# Patient Record
Sex: MALE | Race: WHITE | Employment: PART TIME | URBAN - METROPOLITAN AREA
[De-identification: names, ages, dates, MRNs, and addresses within clinical notes are randomized per-mention and may not be internally consistent; named-entity substitution may affect disease eponyms.]

---

## 2020-01-30 ENCOUNTER — MEDICAL CORRESPONDENCE (OUTPATIENT)
Dept: HEALTH INFORMATION MANAGEMENT | Facility: CLINIC | Age: 20
End: 2020-01-30

## 2020-03-06 ENCOUNTER — TRANSFERRED RECORDS (OUTPATIENT)
Dept: HEALTH INFORMATION MANAGEMENT | Facility: CLINIC | Age: 20
End: 2020-03-06

## 2020-09-10 VITALS — BODY MASS INDEX: 24.38 KG/M2 | WEIGHT: 180 LBS | HEIGHT: 72 IN

## 2020-09-10 RX ORDER — ERGOCALCIFEROL (VITAMIN D2) 10 MCG
25 TABLET ORAL DAILY
COMMUNITY

## 2020-09-10 RX ORDER — TRAZODONE HYDROCHLORIDE 50 MG/1
50 TABLET, FILM COATED ORAL AT BEDTIME
COMMUNITY

## 2020-09-10 ASSESSMENT — MIFFLIN-ST. JEOR: SCORE: 1859.47

## 2020-09-10 NOTE — PROGRESS NOTES
"William Forman is a 20 year old male who is being evaluated via a billable video visit.      The patient has been notified of following:     \"This video visit will be conducted via a call between you and your physician/provider. We have found that certain health care needs can be provided without the need for an in-person physical exam.  This service lets us provide the care you need with a video conversation.  If a prescription is necessary we can send it directly to your pharmacy.  If lab work is needed we can place an order for that and you can then stop by our lab to have the test done at a later time.    Video visits are billed at different rates depending on your insurance coverage.  Please reach out to your insurance provider with any questions.    If during the course of the call the physician/provider feels a video visit is not appropriate, you will not be charged for this service.\"    Patient has given verbal consent for Video visit? Yes  How would you like to obtain your AVS? Mail a copy  If you are dropped from the video visit, the video invite should be resent to: Text to cell phone: [phone]  Will anyone else be joining your video visit? No        Video-Visit Details    Type of service:  Video Visit    Video Start Time: 11:04 PM  Video End Time: 11:57 AM    Originating Location (pt. Location): Home    Distant Location (provider location):  [facility]     Platform used for Video Visit: Dennise Del Valle MD      "

## 2020-09-10 NOTE — PATIENT INSTRUCTIONS
Change your evening routine-avoid watching screens consider reading a positive book  Consider light blocking mask and white noise in bedroom  Eliminate or at least shorten naps  Cut back on caffeine to two in am              Insomnia and Behavioral Sleep Medicine Program    The [facility] Insomnia and Behavioral Sleep Medicine Program provides evidence-based non-drug treatment including:      Cognitive-behavioral Therapies for Insomnia (CBT-I)    Management of Shift-work and Jet Lag    Management of Delayed, Advanced and Irregular Circadian Rhythm Sleep Disorders    Frequently Asked Questions:    What is CBT-I?    Cognitive Behavioral Therapy for Insomnia, also known as CBT-I, is a highly effective non-drug treatment for insomnia. The American College of Physicians recommends CBT-I as the first treatment for chronic insomnia.  Research has shown CBT-I to be safer and more effective long term than sleeping pills.    What does CBT-I involve?     CBT-I targets behaviors that lead to chronic insomnia:    Habits that weaken the bed as a cue for sleep    Habits that weaken your body's sleep drive and sleep/wake clock     Unhelpful sleep thoughts that increase sleep-related worry and arousal.    The process works like a 4-6 session training program that provides you with the information and coaching needed to implement proven strategies to get a better night's sleep.    People often see improvement in their sleep within a few weeks. Research shows if you keep practicing the skills you learn your sleep is likely to continue to improve 6-12 months after treatment.    Does this program prescribe or manage sleep medication?    No.  Your prescribing provider is responsible to assist you in managing your sleep medications.  Some people choose to stop using sleep medication prior to or during CBT-I.  Our program can work with your prescribing providers to help reduce or eliminate use of sleep medications. Always talk with  "you prescribing provider before making any changes to your medication.     Preparing for your Insomnia Evaluation:    The [facility] Insomnia and Behavioral Sleep Medicine Program offers treatment at our [facility] as well as certain primary and specialty care clinics.  You can also receive care from the convenience of your own home through Telehealth visits or a guided internet CBT-I option.     If you have never been seen at an [facility], you will need to complete the Insomnia Health Questionnaire prior to your initial visit.  Click or copy the following link into your browser to complete: [email or web address]    Complete a daily Sleep Diary by downloading the free sleep marnie CBTi- marnie, enter your sleep data in the My Sleep section daily and have available for your visit. You can also use a paper diary which you can return by Hypr[employer] message, by email to [email or web address], or by fax to [phone].    Questions?      Call [phone] or email us at [email or web address]     MY TREATMENT INFORMATION FOR SLEEP APNEA-  William Forman    DOCTOR : Melissa Del Valle MD  SLEEP CENTER :      MY CONTACT NUMBER:     Am I having a sleep study at a sleep center?  Make sure you have an appointment for the study before you leave!    Am I having a home sleep study?  Watch this video:  /drop off device-   [email or web address]/[email or web address]?v=[email or web address]  Disposable device sent out require phone/computer application-   [email or web address]/watch?v=BCce_vbiwxE  Please verify your insurance coverage with your insurance carrier    Frequently asked questions:  1. What is Obstructive Sleep Apnea (BLANCHE)? BLANCHE is the most common type of sleep apnea. Apnea means, \"without breath.\"  Apnea is most often caused by narrowing or collapse of the upper airway as muscles relax during sleep.   Almost everyone has occasional apneas. Most people with sleep apnea have had brief " interruptions at night frequently for many years.  The severity of sleep apnea is related to how frequent and severe the events are.   2. What are the consequences of BLANCHE? Symptoms include: feeling sleepy during the day, snoring loudly, gasping or stopping of breathing, trouble sleeping, and occasionally morning headaches or heartburn at night.  Sleepiness can be serious and even increase the risk of falling asleep while driving. Other health consequences may include development of high blood pressure and other cardiovascular disease in persons who are susceptible. Untreated BLANCHE  can contribute to heart disease, stroke and diabetes.   3. What are the treatment options? In most situations, sleep apnea is a lifelong disease that must be managed with daily therapy. Medications are not effective for sleep apnea and surgery is generally not considered until other therapies have been tried. Your treatment is your choice . Continuous Positive Airway (CPAP) works right away and is the therapy that is effective in nearly everyone. An oral device to hold your jaw forward is usually the next most reliable option. Other options include postioning devices (to keep you off your back), weight loss, and surgery including a tongue pacing device. There is more detail about some of these options below.    Important tips for using CPAP and similar devices   Know your equipment:  CPAP is continuous positive airway pressure that prevents obstructive sleep apnea by keeping the throat from collapsing while you are sleeping. In most cases, the device is  smart  and can slowly self-adjusts if your throat collapses and keeps a record every day of how well you are treated-this information is available to you and your care team.  BPAP is bilevel positive airway pressure that keeps your throat open and also assists each breath with a pressure boost to maintain adequate breathing.  Special kinds of BPAP are used in patients who have inadequate  breathing from lung or heart disease. In most cases, the device is  smart  and can slowly self-adjusts to assist breathing. Like CPAP, the device keeps a record of how well you are treated.  Your mask is your connection to the device. You get to choose what feels most comfortable and the staff will help to make sure if fits. Here: are some examples of the different masks that are available:       Key points to remember on your journey with sleep apnea:  1. Sleep study.  PAP devices often need to be adjusted during a sleep study to show that they are effective and adjusted right.  2. Good tips to remember: Try wearing just the mask during a quiet time during the day so your body adapts to wearing it. A humidifier is recommended for comfort in most cases to prevent drying of your nose and throat. Allergy medication from your provider may help you if you are having nasal congestion.  3. Getting settled-in. It takes more than one night for most of us to get used to wearing a mask. Try wearing just the mask during a quiet time during the day so your body adapts to wearing it. A humidifier is recommended for comfort in most cases. Our team will work with you carefully on the first day and will be in contact within 4 days and again at 2 and 4 weeks for advice and remote device adjustments. Your therapy is evaluated by the device each day.   4. Use it every night. The more you are able to sleep naturally for 7-8 hours, the more likely you will have good sleep and to prevent health risks or symptoms from sleep apnea. Even if you use it 4 hours it helps. Occasionally all of us are unable to use a medical therapy, in sleep apnea, it is not dangerous to miss one night.   5. Communicate. Call our skilled team on the number provided on the first day if your visit for problems that make it difficult to wear the device. Over 2 out of 3 patients can learn to wear the device long-term with help from our team. Remember to call our  team or your sleep providers if you are unable to wear the device as we may have other solutions for those who cannot adapt to mask CPAP therapy. It is recommended that you sleep your sleep provider within the first 3 months and yearly after that if you are not having problems.   6. Use it for your health. We encourage use of CPAP masks during daytime quiet periods to allow your face and brain to adapt to the sensation of CPAP so that it will be a more natural sensation to awaken to at night or during naps. This can be very useful during the first few weeks or months of adapting to CPAP though it does not help medically to wear CPAP during wakefulness and  should not be used as a strategy just to meet guidelines.  7. Take care of your equipment. Make sure you clean your mask and tubing using directions every day and that your filter and mask are replaced as recommended or if they are not working.     BESIDES CPAP, WHAT OTHER THERAPIES ARE THERE?    Positioning Device  Positioning devices are generally used when sleep apnea is mild and only occurs on your back.This example shows a pillow that straps around the waist. It may be appropriate for those whose sleep study shows milder sleep apnea that occurs primarily when lying flat on one's back. Preliminary studies have shown benefit but effectiveness at home may need to be verified by a home sleep test. These devices are generally not covered by medical insurance.  Examples of devices that maintain sleeping on the back to prevent snoring and mild sleep apnea.    Belt type body positioner  Http://Faculte.Paddle8/    Electronic reminder  Http://nightshifttherapy.com/  Http://[email or web address].Paddle8.au/      Oral Appliance  What is oral appliance therapy?  An oral appliance device fits on your teeth at night like a retainer used after having braces. The device is made by a specialized dentist and requires several visits over 1-2 months before a manufactured device is made to fit  your teeth and is adjusted to prevent your sleep apnea. Once an oral device is working properly, snoring should be improved. A home sleep test may be recommended at that time if to determine whether the sleep apnea is adequately treated.       Some things to remember:  -Oral devices are often, but not always, covered by your medical insurance. Be sure to check with your insurance provider.   -If you are referred for oral therapy, you will be given a list of specialized dentists to consider or you may choose to visit the Web site of the American Academy of Dental Sleep Medicine  -Oral devices are less likely to work if you have severe sleep apnea or are extremely overweight.     More detailed information  An oral appliance is a small acrylic device that fits over the upper and lower teeth  (similar to a retainer or a mouth guard). This device slightly moves jaw forward, which moves the base of the tongue forward, opens the airway, improves breathing for effective treat snoring and obstructive sleep apnea in perhaps 7 out of 10 people .  The best working devices are custom-made by a dental device  after a mold is made of the teeth 1, 2, 3.  When is an oral appliance indicated?  Oral appliance therapy is recommended as a first-line treatment for patients with primary snoring, mild sleep apnea, and for patients with moderate sleep apnea who prefer appliance therapy to use of CPAP4, 5. Severity of sleep apnea is determined by sleep testing and is based on the number of respiratory events per hour of sleep.   How successful is oral appliance therapy?  The success rate of oral appliance therapy in patients with mild sleep apnea is 75-80% while in patients with moderate sleep apnea it is 50-70%. The chance of success in patients with severe sleep apnea is 40-50%. The research also shows that oral appliances have a beneficial effect on the cardiovascular health of BLANCHE patients at the same magnitude as CPAP  therapy7.  Oral appliances should be a second-line treatment in cases of severe sleep apnea, but if not completely successful then a combination therapy utilizing CPAP plus oral appliance therapy may be effective. Oral appliances tend to be effective in a broad range of patients although studies show that the patients who have the highest success are females, younger patients, those with milder disease, and less severe obesity. 3, 6.   Finding a dentist that practices dental sleep medicine  Specific training is available through the American Academy of Dental Sleep Medicine for dentists interested in working in the field of sleep. To find a dentist who is educated in the field of sleep and the use of oral appliances, near you, visit the Web site of the American Academy of Dental Sleep Medicine.    References  1. Ethel et al. Objectively measured vs self-reported compliance during oral appliance therapy for sleep-disordered breathing. Chest 2013; 144(5): 1827-7501.  2. Ray et al. Objective measurement of compliance during oral appliance therapy for sleep-disordered breathing. Thorax 2013; 68(1): 91-96.  3. Erna et al. Mandibular advancement devices in 620 men and women with BLANCHE and snoring: tolerability and predictors of treatment success. Chest 2004; 125: 2737-8272.  4. Leighton, et al. Oral appliances for snoring and BLANCHE: a review. Sleep 2006; 29: 244-262.  5. Peter et al. Oral appliance treatment for BLANCHE: an update. J Clin Sleep Med 2014; 10(2): 215-227.  6. Lubna et al. Predictors of OSAH treatment outcome. J Dent Res 2007; 86: 9156-3414.      Weight Loss:    Weight loss is a long-term strategy that may improve sleep apnea in some patients.      Surgery:    Surgery for obstructive sleep apnea is considered generally only when other therapies fail to work. Surgery may be discussed with you if you are having a difficult time tolerating CPAP and or when there is an abnormal structure  that requires surgical correction.  Nose and throat surgeries often enlarge the airway to prevent collapse.  Most of these surgeries create pain for 1-2 weeks and up to half of the most common surgeries are not effective throughout life.  You should carefully discuss the benefits and drawbacks to surgery with your sleep provider and surgeon to determine if it is the best solution for you.   More information  Surgery for BLANCHE is directed at areas that are responsible for narrowing or complete obstruction of the airway during sleep.  There are a wide range of procedures available to enlarge and/or stabilize the airway to prevent blockage of breathing in the three major areas where it can occur: the palate, tongue, and nasal regions.  Successful surgical treatment depends on the accurate identification of the factors responsible for obstructive sleep apnea in each person.  A personalized approach is required because there is no single treatment that works well for everyone.  Because of anatomic variation, consultation with an examination by a sleep surgeon is a critical first step in determining what surgical options are best for each patient.  In some cases, examination during sedation may be recommended in order to guide the selection of procedures.  Patients will be counseled about risks and benefits as well as the typical recovery course after surgery. Surgery is typically not a cure for a person s BLANCHE.  However, surgery will often significantly improve one s BLANCHE severity (termed  success rate ).  Even in the absence of a cure, surgery will decrease the cardiovascular risk associated with OSA7; improve overall quality of life8 (sleepiness, functionality, sleep quality, etc).      Palate Procedures:  Patients with BLANCHE often have narrowing of their airway in the region of their tonsils and uvula.  The goals of palate procedures are to widen the airway in this region as well as to help the tissues resist collapse.   Modern palate procedure techniques focus on tissue conservation and soft tissue rearrangement, rather than tissue removal.  Often the uvula is preserved in this procedure. Residual sleep apnea is common in patient after pharyngoplasty with an average reduction in sleep apnea events of 33%2.      Tongue Procedures:  ExamWhile patients are awake, the muscles that surround the throat are active and keep this region open for breathing. These muscles relax during sleep, allowing the tongue and other structures to collapse and block breathing.  There are several different tongue procedures available.  Selection of a tongue base procedure depends on characteristics seen on physical exam.  Generally, procedures are aimed at removing bulky tissues in this area or preventing the back of the tongue from falling back during sleep.  Success rates for tongue surgery range from 50-62%3.    Hypoglossal Nerve Stimulation:  Hypoglossal nerve stimulation has recently received approval from the [place] Food and Drug Administration for the treatment of obstructive sleep apnea.  This is based on research showing that the system was safe and effective in treating sleep apnea6.  Results showed that the median AHI score decreased 68%, from 29.3 to 9.0. This therapy uses an implant system that senses breathing patterns and delivers mild stimulation to airway muscles, which keeps the airway open during sleep.  The system consists of three fully implanted components: a small generator (similar in size to a pacemaker), a breathing sensor, and a stimulation lead.  Using a small handheld remote, a patient turns the therapy on before bed and off upon awakening.    Candidates for this device must be greater than 22 years of age, have moderate to severe BLANCHE (AHI between 20-65), BMI less than 32, have tried CPAP/oral appliance without success, and have appropriate upper airway anatomy (determined by a sleep endoscopy performed by   Hsia).    Hypoglossal Nerve Stimulation Pathway:    The sleep surgeon s office will work with the patient through the insurance prior-authorization process (including communications and appeals).    Nasal Procedures:  Nasal obstruction can interfere with nasal breathing during the day and night.  Studies have shown that relief of nasal obstruction can improve the ability of some patients to tolerate positive airway pressure therapy for obstructive sleep apnea1.  Treatment options include medications such as nasal saline, topical corticosteroid and antihistamine sprays, and oral medications such as antihistamines or decongestants. Non-surgical treatments can include external nasal dilators for selected patients. If these are not successful by themselves, surgery can improve the nasal airway either alone or in combination with these other options.      Combination Procedures:  Combination of surgical procedures and other treatments may be recommended, particularly if patients have more than one area of narrowing or persistent positional disease.  The success rate of combination surgery ranges from 66-80%2,3.    References  1. Kate FERNANDEZ. The Role of the Nose in Snoring and Obstructive Sleep Apnoea: An Update.  Eur Arch Otorhinolaryngol. 2011; 268: 1365-73.  2.  Saroj SM; Stefany JA; Yessica JR; Pallanch JF; Yuli MB; Eliezer SG; Alba LANCASTER. Surgical modifications of the upper airway for obstructive sleep apnea in adults: a systematic review and meta-analysis. SLEEP 2010;33(10):6444-4732. Vishal ANTONY. Hypopharyngeal surgery in obstructive sleep apnea: an evidence-based medicine review.  Arch Otolaryngol Head Neck Surg. 2006 Feb;132(2):206-13.  3. Carlos RICHARDSONH1, Norman Y, Xavi SHIMA. The efficacy of anatomically based multilevel surgery for obstructive sleep apnea. Otolaryngol Head Neck Surg. 2003 Oct;129(4):327-35.  4. Vishal ANTONY, Goldberg A. Hypopharyngeal Surgery in Obstructive Sleep Apnea: An Evidence-Based Medicine Review.  Arch Otolaryngol Head Neck Surg. 2006 Feb;132(2):206-13.  5. Weston PJ et al. Upper-Airway Stimulation for Obstructive Sleep Apnea.  N Engl J Med. 2014 Jan 9;370(2):139-49.  6. Luisito Y et al. Increased Incidence of Cardiovascular Disease in Middle-aged Men with Obstructive Sleep Apnea. Am J Respir Crit Care Med; 2002 166: 159-165  7. Frank SMITH et al. Studying Life Effects and Effectiveness of Palatopharyngoplasty (SLEEP) study: Subjective Outcomes of Isolated Uvulopalatopharyngoplasty. Otolaryngol Head Neck Surg. 2011; 144: 623-631.  Your BMI is Body mass index is 24.41 kg/m .   This is a heathy weight.

## 2020-09-11 ENCOUNTER — VIRTUAL VISIT (OUTPATIENT)
Dept: SLEEP MEDICINE | Facility: CLINIC | Age: 20
End: 2020-09-11
Payer: COMMERCIAL

## 2020-09-11 DIAGNOSIS — G47.50 PARASOMNIA, UNSPECIFIED TYPE: ICD-10-CM

## 2020-09-11 DIAGNOSIS — R51.9 MORNING HEADACHE: ICD-10-CM

## 2020-09-11 DIAGNOSIS — R68.2 DRY MOUTH: ICD-10-CM

## 2020-09-11 DIAGNOSIS — R41.840 POOR CONCENTRATION: ICD-10-CM

## 2020-09-11 DIAGNOSIS — F51.5 NIGHTMARE DISORDER: ICD-10-CM

## 2020-09-11 DIAGNOSIS — G47.10 HYPERSOMNIA: ICD-10-CM

## 2020-09-11 DIAGNOSIS — G47.00 INSOMNIA, UNSPECIFIED TYPE: Primary | ICD-10-CM

## 2020-09-11 PROCEDURE — 99203 OFFICE O/P NEW LOW 30 MIN: CPT | Mod: 95 | Performed by: INTERNAL MEDICINE

## 2020-09-11 SDOH — HEALTH STABILITY: MENTAL HEALTH: HOW OFTEN DO YOU HAVE A DRINK CONTAINING ALCOHOL?: MONTHLY OR LESS

## 2020-09-11 SDOH — HEALTH STABILITY: MENTAL HEALTH: HOW MANY STANDARD DRINKS CONTAINING ALCOHOL DO YOU HAVE ON A TYPICAL DAY?: 1 OR 2

## 2020-09-11 NOTE — LETTER
"    9/11/2020         RE: William Forman  [address]  [facility] [address]        Dear Colleague,    Thank you for referring your patient, William Forman, to the [facility]. Please see a copy of my visit note below.    William Forman is a 20 year old male who is being evaluated via a billable video visit.      The patient has been notified of following:     \"This video visit will be conducted via a call between you and your physician/provider. We have found that certain health care needs can be provided without the need for an in-person physical exam.  This service lets us provide the care you need with a video conversation.  If a prescription is necessary we can send it directly to your pharmacy.  If lab work is needed we can place an order for that and you can then stop by our lab to have the test done at a later time.    Video visits are billed at different rates depending on your insurance coverage.  Please reach out to your insurance provider with any questions.    If during the course of the call the physician/provider feels a video visit is not appropriate, you will not be charged for this service.\"    Patient has given verbal consent for Video visit? Yes  How would you like to obtain your AVS? Mail a copy  If you are dropped from the video visit, the video invite should be resent to: Text to cell phone: [phone]  Will anyone else be joining your video visit? No        Video-Visit Details    Type of service:  Video Visit    Video Start Time: 11:04 PM  Video End Time: 11:57 AM    Originating Location (pt. Location): Home    Distant Location (provider location):  [facility]     Platform used for Video Visit: Dennise Del Valle MD        Chief complaint: Consultation requested by MARCOS Stevenson CNP for evaluation of poor sleep    History of Present Illness:20-year-old gentleman with history of mild depression and anxiety.  He " "reports trouble falling asleep and staying asleep and nightmares for as long as he can remember.  He can take him anywhere from 30 minutes to 2 hours to fall asleep.  He is currently trying to go to bed around midnight.  Once he falls asleep he feels like he wakes up a couple of times at least.  He may wake up as part of a dream and be a little confused sometimes and not know what is real.  He frequently has disturbing nightmares that are very vivid.  They often have recurrent emotional contents but not necessarily recurrent visual content.  Sometimes he is watching someone being tortured or being tortured himself or watching somebody getting killed or killing someone himself.  He has been told by his fiancée about movement activity at night but this is not frequent.  She has not been harmed nor has he harmed himself.  No clear sleepwalking.  He denies sleep paralysis or hypnagogic hallucinations.    He does feel that he has difficulty maintaining attention during the day.  When he is working as a  he denies significant difficulty because this is more physical work.  He has trouble completing tasks.  He takes naps 6 to 7 days a week often around 2 in the afternoon.  They can be 1 to 2 hours in length.  He typically gets up for the day around 10 AM.  He drinks about 2 \"K cups\" of coffee as well as 2 bottles of soda throughout the day last around 8 PM.    Years ago he tried melatonin for a couple of weeks did not find it beneficial.  Currently he is started trazodone 50 mg about 4 weeks ago when he feels like it is helping him sleep a little bit longer before his first awakening.  In the past he is also tried prazosin for 4 to 6 weeks or so without significant benefit doses were not increased.  His bedroom is not entirely dark they do not have very good quality blinds.  He also states that is not entirely quiet.  Sometimes he will listen to music.  The bedroom is usually cool.    He occasionally snores but not " particularly loud.  There is a family history of obstructive sleep apnea.  He frequently wakes up with dry throat and has headaches upon awakening at least once a week.  He rarely wakes up with a sense of gasping.  He occasionally has neck pain upon awakening but no jaw pain.      Total score - [place]: 11 (9/10/2020  8:32 AM) (Less than 10 normal)    JEANNE Total Score: 23 (normal 0-7, mild 8-14, moderate 15-21, severe 22-28)    STOP-BANG  Loud Snore   0  Excessively Tired/Sleepy   1  Observed apnea   0  Hypertension   0  BMI> 35 kg/m2   0  Age >50   0  Neck >16 in/40cm   0  Male Gender   1  Total =   2  (0-2 low, 3-4 intermediate, 5-8 high risk of BLANCHE)      Past Medical History:   Diagnosis Date     Allergic rhinitis      Anxiety      Depression      GERD (gastroesophageal reflux disease)      IBS (irritable bowel syndrome)        Allergies   Allergen Reactions     Penicillins        Current Outpatient Medications   Medication     ergocalciferol (VITAMIN D2) 400 units (10 mcg) TABS tablet     sertraline (ZOLOFT) 50 MG tablet     traZODone (DESYREL) 50 MG tablet     No current facility-administered medications for this visit.        Social History     Socioeconomic History     Marital status: Single     Spouse name: Not on file     Number of children: Not on file     Years of education: Not on file     Highest education level: Not on file   Occupational History     Not on file   Social Needs     Financial resource strain: Not on file     Food insecurity     Worry: Not on file     Inability: Not on file     Transportation needs     Medical: Not on file     Non-medical: Not on file   Tobacco Use     Smoking status: Never Smoker     Smokeless tobacco: Never Used   Substance and Sexual Activity     Alcohol use: Yes     Frequency: Monthly or less     Drinks per session: 1 or 2     Drug use: Not Currently     Sexual activity: Not on file   Lifestyle     Physical activity     Days per week: Not on file     Minutes per  session: Not on file     Stress: Not on file   Relationships     Social connections     Talks on phone: Not on file     Gets together: Not on file     Attends Taoist service: Not on file     Active member of club or organization: Not on file     Attends meetings of clubs or organizations: Not on file     Relationship status: Not on file     Intimate partner violence     Fear of current or ex partner: Not on file     Emotionally abused: Not on file     Physically abused: Not on file     Forced sexual activity: Not on file   Other Topics Concern     Not on file   Social History Narrative     Not on file       Family History   Problem Relation Age of Onset     Migraines Mother      Irritable Bowel Syndrome Mother      GERD Mother      Asthma Father      Allergies Father      Sleep Apnea Father        Review of Systems: Positve for GERN, IBS, seasonal allergic rhinitis, and per HPI, otherwise comprehensive review of systems is negative.    EXAM:  Ht 1.829 m (6')   Wt 81.6 kg (180 lb)   BMI 24.41 kg/m    GENERAL: Healthy, alert and no distress  EYES: Eyes grossly normal to inspection.  No discharge or erythema, or obvious scleral/conjunctival abnormalities.  RESP: No audible wheeze, cough, or visible cyanosis.  No visible retractions or increased work of breathing.    SKIN: Visible skin clear. No significant rash, abnormal pigmentation or lesions.  NEURO: Cranial nerves grossly intact.  Mentation and speech appropriate for age.  PSYCH: Mentation appears normal, affect normal/bright, judgement and insight intact, normal speech and appearance well-groomed.      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  Dry mouth and headaches could be associated with oral breathing and allergic rhinitis but also sleep apnea.  He would benefit from formal cognitive behavioral therapy for insomnia  and improving his sleep routine.  He may benefit from eliminating naps and restricting time in bed.  He may benefit from image rehearsal therapy.    PLAN:  Extensive discussion regarding insomnia, cognitive behavioral therapy for insomnia, nightmares and nightmare disorder.  Recommended in lab polysomnography with parasomnia protocol to evaluate parasomnia as well as sleep disordered breathing and disorders of arousal.  Will generate formal referral for cognitive behavioral therapy for insomnia.  In the meantime recommended patient cut back caffeine to 2 in the morning.  Also urged him to shorten the daytime naps if not eliminate them entirely.  Also recommended that he avoid watching screens before bed and consider reading positive book to generate positive imagery before bed.  We also reviewed potential treatment options if indeed he does have sleep apnea.  Do not recommend resuming prazosin at this time.    Melissa Del Valle M.D.  Pulmonary/Critical Care/Sleep Medicine    [facility]   [address], [address]   [address] [place] [address]. [place] [address]   Appointments: [phone]    The above note was dictated using voice recognition software and may include typographical errors. Please contact the author for any clarifications.              Again, thank you for allowing me to participate in the care of your patient.        Sincerely,        Melissa Del Valle MD

## 2020-09-11 NOTE — PROGRESS NOTES
"Chief complaint: Consultation requested by MARCOS Stevenson CNP for evaluation of poor sleep    History of Present Illness:20-year-old gentleman with history of mild depression and anxiety.  He reports trouble falling asleep and staying asleep and nightmares for as long as he can remember.  He can take him anywhere from 30 minutes to 2 hours to fall asleep.  He is currently trying to go to bed around midnight.  Once he falls asleep he feels like he wakes up a couple of times at least.  He may wake up as part of a dream and be a little confused sometimes and not know what is real.  He frequently has disturbing nightmares that are very vivid.  They often have recurrent emotional contents but not necessarily recurrent visual content.  Sometimes he is watching someone being tortured or being tortured himself or watching somebody getting killed or killing someone himself.  He has been told by his fiancée about movement activity at night but this is not frequent.  She has not been harmed nor has he harmed himself.  No clear sleepwalking.  He denies sleep paralysis or hypnagogic hallucinations.    He does feel that he has difficulty maintaining attention during the day.  When he is working as a  he denies significant difficulty because this is more physical work.  He has trouble completing tasks.  He takes naps 6 to 7 days a week often around 2 in the afternoon.  They can be 1 to 2 hours in length.  He typically gets up for the day around 10 AM.  He drinks about 2 \"K cups\" of coffee as well as 2 bottles of soda throughout the day last around 8 PM.    Years ago he tried melatonin for a couple of weeks did not find it beneficial.  Currently he is started trazodone 50 mg about 4 weeks ago when he feels like it is helping him sleep a little bit longer before his first awakening.  In the past he is also tried prazosin for 4 to 6 weeks or so without significant benefit doses were not increased.  His bedroom is not " entirely dark they do not have very good quality blinds.  He also states that is not entirely quiet.  Sometimes he will listen to music.  The bedroom is usually cool.    He occasionally snores but not particularly loud.  There is a family history of obstructive sleep apnea.  He frequently wakes up with dry throat and has headaches upon awakening at least once a week.  He rarely wakes up with a sense of gasping.  He occasionally has neck pain upon awakening but no jaw pain.      Total score - [place]: 11 (9/10/2020  8:32 AM) (Less than 10 normal)    JEANNE Total Score: 23 (normal 0-7, mild 8-14, moderate 15-21, severe 22-28)    STOP-BANG  Loud Snore   0  Excessively Tired/Sleepy   1  Observed apnea   0  Hypertension   0  BMI> 35 kg/m2   0  Age >50   0  Neck >16 in/40cm   0  Male Gender   1  Total =   2  (0-2 low, 3-4 intermediate, 5-8 high risk of BLANCHE)      Past Medical History:   Diagnosis Date     Allergic rhinitis      Anxiety      Depression      GERD (gastroesophageal reflux disease)      IBS (irritable bowel syndrome)        Allergies   Allergen Reactions     Penicillins        Current Outpatient Medications   Medication     ergocalciferol (VITAMIN D2) 400 units (10 mcg) TABS tablet     sertraline (ZOLOFT) 50 MG tablet     traZODone (DESYREL) 50 MG tablet     No current facility-administered medications for this visit.        Social History     Socioeconomic History     Marital status: Single     Spouse name: Not on file     Number of children: Not on file     Years of education: Not on file     Highest education level: Not on file   Occupational History     Not on file   Social Needs     Financial resource strain: Not on file     Food insecurity     Worry: Not on file     Inability: Not on file     Transportation needs     Medical: Not on file     Non-medical: Not on file   Tobacco Use     Smoking status: Never Smoker     Smokeless tobacco: Never Used   Substance and Sexual Activity     Alcohol use: Yes      Frequency: Monthly or less     Drinks per session: 1 or 2     Drug use: Not Currently     Sexual activity: Not on file   Lifestyle     Physical activity     Days per week: Not on file     Minutes per session: Not on file     Stress: Not on file   Relationships     Social connections     Talks on phone: Not on file     Gets together: Not on file     Attends Latter day service: Not on file     Active member of club or organization: Not on file     Attends meetings of clubs or organizations: Not on file     Relationship status: Not on file     Intimate partner violence     Fear of current or ex partner: Not on file     Emotionally abused: Not on file     Physically abused: Not on file     Forced sexual activity: Not on file   Other Topics Concern     Not on file   Social History Narrative     Not on file       Family History   Problem Relation Age of Onset     Migraines Mother      Irritable Bowel Syndrome Mother      GERD Mother      Asthma Father      Allergies Father      Sleep Apnea Father        Review of Systems: Positve for GERN, IBS, seasonal allergic rhinitis, and per HPI, otherwise comprehensive review of systems is negative.    EXAM:  Ht 1.829 m (6')   Wt 81.6 kg (180 lb)   BMI 24.41 kg/m    GENERAL: Healthy, alert and no distress  EYES: Eyes grossly normal to inspection.  No discharge or erythema, or obvious scleral/conjunctival abnormalities.  RESP: No audible wheeze, cough, or visible cyanosis.  No visible retractions or increased work of breathing.    SKIN: Visible skin clear. No significant rash, abnormal pigmentation or lesions.  NEURO: Cranial nerves grossly intact.  Mentation and speech appropriate for age.  PSYCH: Mentation appears normal, affect normal/bright, judgement and insight intact, normal speech and appearance well-groomed.      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  Dry mouth and headaches could be associated with oral breathing and allergic rhinitis but also sleep apnea.  He would benefit from formal cognitive behavioral therapy for insomnia and improving his sleep routine.  He may benefit from eliminating naps and restricting time in bed.  He may benefit from image rehearsal therapy.    PLAN:  Extensive discussion regarding insomnia, cognitive behavioral therapy for insomnia, nightmares and nightmare disorder.  Recommended in lab polysomnography with parasomnia protocol to evaluate parasomnia as well as sleep disordered breathing and disorders of arousal.  Will generate formal referral for cognitive behavioral therapy for insomnia.  In the meantime recommended patient cut back caffeine to 2 in the morning.  Also urged him to shorten the daytime naps if not eliminate them entirely.  Also recommended that he avoid watching screens before bed and consider reading positive book to generate positive imagery before bed.  We also reviewed potential treatment options if indeed he does have sleep apnea.  Do not recommend resuming prazosin at this time.    Melissa Del Valle M.D.  Pulmonary/Critical Care/Sleep Medicine    [facility]   Floor [address], [address]   [address] [place] [address]   Appointments: [phone]    The above note was dictated using voice recognition software and may include typographical errors. Please contact the author for any clarifications.

## 2020-09-23 NOTE — NURSING NOTE
PSG and follow up for results scheduled.  CBTI referral sent to [facility] for scheduling.    Brenton Wharton  Sleep Clinic Specialist - [place]

## 2020-10-27 ENCOUNTER — THERAPY VISIT (OUTPATIENT)
Dept: SLEEP MEDICINE | Facility: CLINIC | Age: 20
End: 2020-10-27
Payer: COMMERCIAL

## 2020-10-27 ENCOUNTER — TELEPHONE (OUTPATIENT)
Dept: SLEEP MEDICINE | Facility: CLINIC | Age: 20
End: 2020-10-27

## 2020-10-27 DIAGNOSIS — F51.5 NIGHTMARE DISORDER: ICD-10-CM

## 2020-10-27 DIAGNOSIS — R51.9 MORNING HEADACHE: ICD-10-CM

## 2020-10-27 DIAGNOSIS — G47.50 PARASOMNIA, UNSPECIFIED TYPE: ICD-10-CM

## 2020-10-27 DIAGNOSIS — G47.00 INSOMNIA, UNSPECIFIED TYPE: ICD-10-CM

## 2020-10-27 DIAGNOSIS — G47.10 HYPERSOMNIA: ICD-10-CM

## 2020-10-27 DIAGNOSIS — R68.2 DRY MOUTH: ICD-10-CM

## 2020-10-27 DIAGNOSIS — R41.840 POOR CONCENTRATION: ICD-10-CM

## 2020-10-27 PROCEDURE — 95810 POLYSOM 6/> YRS 4/> PARAM: CPT | Performed by: INTERNAL MEDICINE

## 2020-10-27 NOTE — TELEPHONE ENCOUNTER
Reason for call:  Other   Patient called regarding (reason for call): call back  Additional comments: Patient is requesting a call back to discuss the prior auth for his sleep study and what coverage was approved.     Phone number to reach patient:  Cell number on file:    [phone]       Best Time:  Any    Can we leave a detailed message on this number?  YES    Travel screening: Negative

## 2020-10-28 NOTE — PATIENT INSTRUCTIONS
[facility]    1. Your sleep study will be reviewed by a sleep physician within the next few days.     2. Please follow up in the sleep clinic as scheduled, or, make an appointment with your sleep provider to be seen within two weeks to discuss the results of the sleep study.    3. If you have any questions or problems with your treatment plan, please contact your sleep clinic provider at [phone] to further manage your condition.    4. Please review your attached medication list, and, at your follow-up appointment advise your sleep clinic provider about any changes.    5. Go to [email or web address] for more information about your sleep problems.    Erick Velez, RPSGT  October 28, 2020

## 2020-11-01 NOTE — PROCEDURES
SLEEP STUDY INTERPRETATION  DIAGNOSTIC POLYSOMNOGRAPHY REPORT      Patient: SUSANA ROSENTHAL  YOB: 2000  Study Date: 10/27/2020  MRN: [id number]  Referring Provider: Self  Ordering Provider: Melissa Del Valle MD    Indications for Polysomnography: The patient is a 20 year old Male who is 6' and weighs 180.0 lbs. His BMI is 24.6, [place] sleepiness scale 11 and neck circumference is 39 cm. Relevant medical history includes allergic rhinitis, depression/anxiety. A diagnostic polysomnogram was performed to evaluate for parasomnia/RBD.    Polysomnogram Data: A full night polysomnogram recorded the standard physiologic parameters including EEG, EOG, EMG, ECG, nasal and oral airflow. Respiratory parameters of chest and abdominal movements were recorded with respiratory inductance plethysmography. Oxygen saturation was recorded by pulse oximetry. Hypopnea scoring rule used: 1B 4%.    Sleep Architecture: Delayed sleep latency and REM latency, mildly increased arousal index.  The total recording time of the polysomnogram was 484.7 minutes. The total sleep time was 350.5 minutes. Sleep latency was increased at 93.5 minutes with the use of a sleep aid (trazodone). REM latency was increased at 283.0 minutes. Arousal index was increased at 29.1 arousals per hour. Sleep efficiency was decreased at 72.3%. Wake after sleep onset was 36.0 minutes. The patient spent 7.6% of total sleep time in Stage N1, 42.1% in Stage N2, 34.4% in Stage N3, and 16.0% in REM. Time in REM supine was - minutes.    Respiration: Intermittent snoring without obstructive sleep apnea    Events ? The polysomnogram revealed a presence of - obstructive, 9 central, and - mixed apneas resulting in an apnea index of 1.5 events per hour. There were 7 obstructive hypopneas and - central hypopneas resulting in an obstructive hypopnea index of 1.2 and central hypopnea index of - events per hour. The combined apnea/hypopnea index was 2.7 events per hour  (central apnea/hypopnea index was 1.5 events per hour). The REM AHI was 1.1 events per hour. The supine AHI was 11.4 events per hour. The RERA index was 1.7 events per hour.  The RDI was 4.5 events per hour.    Snoring - was reported as moderate/intermittent.    Respiratory rate and pattern - was notable for normal respiratory rate and pattern.    Sustained Sleep Associated Hypoventilation - Transcutaneous carbon dioxide monitoring was not used, however significant hypoventilation was not suggested by oximetry.    Sleep Associated Hypoxemia - (Greater than 5 minutes O2 sat at or below 88%) was not present. Baseline oxygen saturation was 96.1%. Lowest oxygen saturation was 88.8%. Time spent less than or equal to 88% was 0 minutes. Time spent less than or equal to 89% was 0.1 minutes.    Movement Activity: Frequent periodic limb movements    Periodic Limb Activity - There were 89 PLMs during the entire study. The PLM index was 15.2 movements per hour. The PLM Arousal Index was 4.8 per hour.    REM EMG Activity - Excessive transient/sustained muscle activity was not present.    Nocturnal Behavior - Abnormal sleep related behaviors were not noted.    Bruxism - None apparent.    Cardiac Summary: Normal sinus rhythm and rates  The average pulse rate was 76.2 bpm. The minimum pulse rate was 57.9 bpm while the maximum pulse rate was 114.0 bpm.  Arrhythmias were not noted.      Assessment:     Delayed sleep latency and REM latency, mildly increased arousal index.    Intermittent snoring without obstructive sleep apnea    Frequent periodic limb movements    Normal sinus rhythm and rates    Recommendations:    Consider Cognitive Behavioral Therapy for Insomnia if clinically indicated.    Pharmacologic therapy should be used for management of restless legs syndrome only if present and clinically indicated and not based on the presence of periodic limb movements alone.    Advice regarding the risks of drowsy driving.    Suggest  optimizing sleep schedule and avoiding sleep deprivation.      Diagnostic Codes:   Unspecified Sleep Disturbance G47.9  Periodic Limb Movement Disorder G47.61  Repetitive Intrusions Into Sleep F51.8       _____________________________________   Electronically Signed By: Melissa Del Valle MD 11/1/2020

## 2020-11-02 LAB — SLPCOMP: NORMAL

## 2020-11-04 NOTE — PROGRESS NOTES
"William Forman is a 20 year old male who is being evaluated via a billable video visit.      The patient has been notified of following:     \"This video visit will be conducted via a call between you and your physician/provider. We have found that certain health care needs can be provided without the need for an in-person physical exam.  This service lets us provide the care you need with a video conversation.  If a prescription is necessary we can send it directly to your pharmacy.  If lab work is needed we can place an order for that and you can then stop by our lab to have the test done at a later time.    Video visits are billed at different rates depending on your insurance coverage.  Please reach out to your insurance provider with any questions.    If during the course of the call the physician/provider feels a video visit is not appropriate, you will not be charged for this service.\"    Patient has given verbal consent for Video visit? Yes  How would you like to obtain your AVS? MyChart  If you are dropped from the video visit, the video invite should be resent to: Send to e-mail at: [email or web address]  Will anyone else be joining your video visit? No        Video-Visit Details    Type of service:  Video Visit    Video Start Time: 11:00  Video End Time: 11:23 AM    Originating Location (pt. Location): Home    Distant Location (provider location):  [facility] [facility]    Platform used for Video Visit: Dennise Del Valle MD    Chief complaint: Follow-up sleep study    History of Present Illness: 20-year-old gentleman with history of depression and anxiety, difficulty with initiating sleep and nightmares.  He underwent overnight polysomnography to evaluate for parasomnia and disorder of arousal.  Since his last visit he reports he has cut back on caffeine by about 1 beverage.  He continues to drink about 2 \"K cups\"  and 1 caffeinated soda.  He continues to have " last caffeine as late as 8 PM.  Typical bedtime is around midnight.  He is taking trazodone to help him sleep.  He continues to wake up around 10 AM and be tired in the morning.  He denies being sleepy during the day but feels his eyes are heavy and feels exhausted.  He has difficulty concentrating at work and at home or school.  He is physically active at his work as a .  His shift started at  9:30 in the morning or 1:30 in the aternoon.  He denies significant difficulty getting to work on time.  He is not getting regular exercise.     The results of the sleep study were reviewed with him in detail:  Delayed sleep latency despite the use of trazodone, mildly increased arousal index.  Intermittent snoring without obstructive sleep apnea.  No evidence for loss of REM atonia.  Mildly increased limb movements.      Total score - [place]: 9 (11/4/2020 11:00 AM) (Less than 10 normal)    JEANNE Total Score: 24 (normal 0-7, mild 8-14, moderate 15-21, severe 22-28)    Past Medical History:   Diagnosis Date     Allergic rhinitis      Anxiety      Depression      GERD (gastroesophageal reflux disease)      IBS (irritable bowel syndrome)        Allergies   Allergen Reactions     Penicillins        Current Outpatient Medications   Medication     ergocalciferol (VITAMIN D2) 400 units (10 mcg) TABS tablet     sertraline (ZOLOFT) 50 MG tablet     traZODone (DESYREL) 50 MG tablet     No current facility-administered medications for this visit.        Social History     Socioeconomic History     Marital status: Single     Spouse name: Not on file     Number of children: Not on file     Years of education: Not on file     Highest education level: Not on file   Occupational History     Not on file   Social Needs     Financial resource strain: Not on file     Food insecurity     Worry: Not on file     Inability: Not on file     Transportation needs     Medical: Not on file     Non-medical: Not on file   Tobacco Use     Smoking  status: Never Smoker     Smokeless tobacco: Never Used   Substance and Sexual Activity     Alcohol use: Yes     Frequency: Monthly or less     Drinks per session: 1 or 2     Drug use: Not Currently     Sexual activity: Not on file   Lifestyle     Physical activity     Days per week: Not on file     Minutes per session: Not on file     Stress: Not on file   Relationships     Social connections     Talks on phone: Not on file     Gets together: Not on file     Attends Catholic service: Not on file     Active member of club or organization: Not on file     Attends meetings of clubs or organizations: Not on file     Relationship status: Not on file     Intimate partner violence     Fear of current or ex partner: Not on file     Emotionally abused: Not on file     Physically abused: Not on file     Forced sexual activity: Not on file   Other Topics Concern     Not on file   Social History Narrative     Not on file       Family History   Problem Relation Age of Onset     Migraines Mother      Irritable Bowel Syndrome Mother      GERD Mother      Asthma Father      Allergies Father      Sleep Apnea Father          EXAM:  There were no vitals taken for this visit.  GENERAL: Healthy, alert and no distress  EYES: Eyes grossly normal to inspection.  No discharge or erythema, or obvious scleral/conjunctival abnormalities.  RESP: No audible wheeze, cough, or visible cyanosis.  No visible retractions or increased work of breathing.    SKIN: Visible skin clear. No significant rash, abnormal pigmentation or lesions.  NEURO: Cranial nerves grossly intact.  Mentation and speech appropriate for age.  PSYCH: Mentation appears normal, affect normal/bright, judgement and insight intact, normal speech and appearance well-groomed.       PSG 10/27/2020  Weight 180 pounds BMI 24.6  AHI 2.7, RDI 4.5, Lowest O2 SAT 89%  PLM arousal index 4.8  Excessive  muscle activity and REM sleep was not present      ASSESSMENT:  20-year-old gentleman with  complaints of insomnia and nightmares and daytime fatigue.  He has history of abnormal behaviors at night but no findings to suggest REM sleep behavior disorder.  He has noticed some improvement in his insomnia with cutting back of caffeine.  Continues to be fatigued during the day but not necessarily drowsy.  Has an appointment with sleep psychologist coming up.    PLAN:  Recommend he keep appointment with sleep psychology for insomnia therapy as well as nightmare disorder.  Urged him to continue measures at cutting back caffeine consider no caffeine after 5 PM.  Also urged him to work on optimizing healthy diet getting more exercise and bright light exposure during the day.  If he should take a nap he should make him short.  Consider further evaluation for possible attention deficit disorder with primary care.  At this point do not recommend further evaluation for hypersomnia disorder as his symptoms are not clearly drowsiness.  He does appear to have a high sleep requirement.        Melissa Del Valle M.D.  Pulmonary/Critical Care/Sleep Medicine    [facility]   [address], [address]   [address] [place] [address]   Appointments: [phone]    The above note was dictated using voice recognition software and may include typographical errors. Please contact the author for any clarifications.

## 2020-11-04 NOTE — PATIENT INSTRUCTIONS
In order to improve sleep quality and your ability to fall asleep keep cutting back on caffeine.  Would recommend no caffeine after 3-5 PM.  If you have to take a nap during the day may get short such as 20 minutes.  Keep your consultation with the sleep psychologist.  In order to improve energy during the day start regular exercise regimen and ensure healthy diet.  Also try to get more bright light during the day.    Consider further evaluation of attention and mood with primary care.

## 2020-11-05 ENCOUNTER — VIRTUAL VISIT (OUTPATIENT)
Dept: SLEEP MEDICINE | Facility: CLINIC | Age: 20
End: 2020-11-05
Payer: COMMERCIAL

## 2020-11-05 DIAGNOSIS — F51.5 NIGHTMARE DISORDER: ICD-10-CM

## 2020-11-05 DIAGNOSIS — G47.50 PARASOMNIA, UNSPECIFIED TYPE: ICD-10-CM

## 2020-11-05 DIAGNOSIS — R53.83 OTHER FATIGUE: ICD-10-CM

## 2020-11-05 DIAGNOSIS — G47.00 INSOMNIA, UNSPECIFIED TYPE: Primary | ICD-10-CM

## 2020-11-05 PROCEDURE — 99214 OFFICE O/P EST MOD 30 MIN: CPT | Mod: 95 | Performed by: INTERNAL MEDICINE

## 2020-12-04 ENCOUNTER — VIRTUAL VISIT (OUTPATIENT)
Dept: SLEEP MEDICINE | Facility: CLINIC | Age: 20
End: 2020-12-04
Attending: INTERNAL MEDICINE
Payer: COMMERCIAL

## 2020-12-04 VITALS — HEIGHT: 72 IN | WEIGHT: 188 LBS | BODY MASS INDEX: 25.47 KG/M2

## 2020-12-04 DIAGNOSIS — F51.5 NIGHTMARE DISORDER: ICD-10-CM

## 2020-12-04 DIAGNOSIS — F51.04 CHRONIC INSOMNIA: Primary | ICD-10-CM

## 2020-12-04 PROCEDURE — 90791 PSYCH DIAGNOSTIC EVALUATION: CPT | Mod: 95 | Performed by: PSYCHOLOGIST

## 2020-12-04 ASSESSMENT — MIFFLIN-ST. JEOR: SCORE: 1895.76

## 2020-12-04 NOTE — PROGRESS NOTES
"William Forman is a 20 year old male who is being evaluated via a billable video visit.      The patient has been notified of following:     \"This video visit will be conducted via a call between you and your physician/provider. We have found that certain health care needs can be provided without the need for an in-person physical exam.  This service lets us provide the care you need with a video conversation.  If a prescription is necessary we can send it directly to your pharmacy.  If lab work is needed we can place an order for that and you can then stop by our lab to have the test done at a later time.    Video visits are billed at different rates depending on your insurance coverage.  Please reach out to your insurance provider with any questions.    If during the course of the call the physician/provider feels a video visit is not appropriate, you will not be charged for this service.\"    Patient has given verbal consent for Video visit? Yes  How would you like to obtain your AVS? MyChart  If you are dropped from the video visit, the video invite should be resent to: Text to cell phone: [phone]  Will anyone else be joining your video visit? No      Video-Visit Details    Type of service:  Video Visit    Video Start Time: 12:59 PM  Video End Time: 1:46 PM    Originating Location (pt. Location): Home    Distant Location (provider location):  [facility]     Platform used for Video Visit: Dennise Bloom PsyD    SLEEP MEDICINE CONSULTATION  Sleep Psychology    Name: William Forman MRN# [id number]   Age: 20 year old YOB: 2000     Date of Consultation: Dec 4, 2020  Consultation is requested by: Melissa Del Valle MD  [address]  [place] [address]  Primary care provider: No Ref-Primary, Physician    Reason for Sleep Consultation     William Forman is a 20 year old male seen today for a behavioral sleep medicine consultation because " of insomnia and nightmares    Assessment and Plan     Sleep Diagnoses/Recommendations:       Chronic insomnia  Nightmare disorder    Patient history of insomnia is multi factored, associated with depression, nightmare disorder and psychophysiologic features including inadequate sleep hygiene.  Sleep hygiene is compromised by use of bedroom for both leisure activities and sleep.  Patient also uses significant caffeine and spends an extended amount of time in bed.  The first strategy to also target recurrent nightmares is to work with patient to help develop more consistent sleep hygiene and to the degree possible, stimulus control.  Patient agreed to reduce time in bed from 11 hours to 9 hours with a sleep schedule of midnight-9 AM.  He does display a mild delay in overall sleep phase.  He will also reduce use of caffeine to 3 beverages and avoid use after 3 PM.  He will discontinue use of screens and other electronic devices by 10:30 PM.  Using the application CBT-I , patient will record his nightly sleep and also track the frequency, intensity and content theme of his nightmares.  He may take a short nap if needed in the early afternoon of no more than 30 minutes.    At follow-up we will review whether behavioral strategies have helped reduce the intensity and frequency of nightmares as well as improved daytime functioning.  If there is no improvement in nightmares, we will then proceed to introduction of imagery rehearsal therapy using the most prominent themes in his nightmare record.    He will continue to follow with his physician around use of trazodone.    Summary Counseling:      William was provided information about the pathophysiology of insomnia and psychophysiological factors contributing to the onset and maintenance of insomnia.  Treatment options were discussed including component of cognitive-behavioral therapy for insomnia. The benefits and potential early side effects of treatment including  increased daytime sleepiness were discussed.       Patient was advised to consult with their prescribing provider around use of or changes to prescription sleep medication.  Patient was counseled on the importance of avoiding driving if drowsy.    See patient instructions for initial treatment recommendations and behavioral sleep plan.    Follow-up: 4 weeks     History of Present Sleep Complaint     William Forman is a 20 year old year old male with a relevant medical history of  Anxiety, depression, nightmre disorder and GERD who presents with longstanding concerns of difficulty initiating and maintainng sleep..    Reports that nightmare began in childhood and produce significant emotional distress and upset. One theme is being back in high school and teachers being aweful to him.  There are also dreams with him either dying or killing.      Precipitants to onset of insomnia: Beginning of quarantine with pandemic.  Nightmares since childhood.      Current Sleep Medication/OTCs:  Trazodone nightly    Previous Medication/OTCs:  None    Behavioral Strategies  relaxation exercises, exercise    Sleep/Wake Pattern    Shift Work - No    Source of Sleep Estimates:  verbal self-report    William Forman       On weekdays/workdays goes to bed at Midnight ;      Gets up for the day at between 9-10 AM  with an alarm;        On weekends/days off gets up later at 1030-11 AM;      Falls asleep in about 20 minutes and denies difficulty falling asleep;       Awakens infrequentl times a night due to nightmares       Average estimated time awake during the night  is  5-10 minutes.      Average time in bed estimated to be 10 hours.      Average total sleep time estmiated to be 9 hours.      Naps - Daily, 30-60 minutes    Sleep Habits and Behavior:    use electronics in bed, watch TV in bed, eating in bed, reading in bed, extend time in bed    Sleep Environment:    Bedroom is quiet, comfortable and dark, Regular bedpartner    Substance  Use:    Caffeine: 3-4  beverages, does drink caffeine within 6 hours of bedtime  Alcohol: None reported  Nicotine: None  Recreational/Illicit Drugs: None reported      Previous Sleep Studies/Consultation:    Study Date: 10/27/2020  Ordering Provider: Melissa Del Valle MD     Indications for Polysomnography: The patient is a 20 year old Male who is 6' and weighs 180.0 lbs. His BMI is 24.6, [place] sleepiness scale 11 and neck circumference is 39 cm. Relevant medical history includes allergic rhinitis, depression/anxiety. A diagnostic polysomnogram was performed to evaluate for parasomnia/RBD.     Polysomnogram Data: A full night polysomnogram recorded the standard physiologic parameters including EEG, EOG, EMG, ECG, nasal and oral airflow. Respiratory parameters of chest and abdominal movements were recorded with respiratory inductance plethysmography. Oxygen saturation was recorded by pulse oximetry. Hypopnea scoring rule used: 1B 4%.     Sleep Architecture: Delayed sleep latency and REM latency, mildly increased arousal index.  The total recording time of the polysomnogram was 484.7 minutes. The total sleep time was 350.5 minutes. Sleep latency was increased at 93.5 minutes with the use of a sleep aid (trazodone). REM latency was increased at 283.0 minutes. Arousal index was increased at 29.1 arousals per hour. Sleep efficiency was decreased at 72.3%. Wake after sleep onset was 36.0 minutes. The patient spent 7.6% of total sleep time in Stage N1, 42.1% in Stage N2, 34.4% in Stage N3, and 16.0% in REM. Time in REM supine was - minutes.     Respiration: Intermittent snoring without obstructive sleep apnea    Events ? The polysomnogram revealed a presence of - obstructive, 9 central, and - mixed apneas resulting in an apnea index of 1.5 events per hour. There were 7 obstructive hypopneas and - central hypopneas resulting in an obstructive hypopnea index of 1.2 and central hypopnea index of - events per hour. The  combined apnea/hypopnea index was 2.7 events per hour (central apnea/hypopnea index was 1.5 events per hour). The REM AHI was 1.1 events per hour. The supine AHI was 11.4 events per hour. The RERA index was 1.7 events per hour.  The RDI was 4.5 events per hour.    Snoring - was reported as moderate/intermittent.    Respiratory rate and pattern - was notable for normal respiratory rate and pattern.    Sustained Sleep Associated Hypoventilation - Transcutaneous carbon dioxide monitoring was not used, however significant hypoventilation was not suggested by oximetry.    Sleep Associated Hypoxemia - (Greater than 5 minutes O2 sat at or below 88%) was not present. Baseline oxygen saturation was 96.1%. Lowest oxygen saturation was 88.8%. Time spent less than or equal to 88% was 0 minutes. Time spent less than or equal to 89% was 0.1 minutes.     Movement Activity: Frequent periodic limb movements    Periodic Limb Activity - There were 89 PLMs during the entire study. The PLM index was 15.2 movements per hour. The PLM Arousal Index was 4.8 per hour.    REM EMG Activity - Excessive transient/sustained muscle activity was not present.    Nocturnal Behavior - Abnormal sleep related behaviors were not noted.    Bruxism - None apparent.     Cardiac Summary: Normal sinus rhythm and rates  The average pulse rate was 76.2 bpm. The minimum pulse rate was 57.9 bpm while the maximum pulse rate was 114.0 bpm.  Arrhythmias were not noted.        Assessment:     Delayed sleep latency and REM latency, mildly increased arousal index.    Intermittent snoring without obstructive sleep apnea    Frequent periodic limb movements    Normal sinus rhythm and rates        Screening          [place] Sleepiness Scale  Total score - [place]: 9 .    JEANNE Total Score: 14        Vitals     Ht 1.829 m (6')   Wt 85.3 kg (188 lb)   BMI 25.50 kg/m       Medical History     Patient Active Problem List   Diagnosis     Nightmare disorder     Insomnia disorder         Current Outpatient Medications   Medication Sig Dispense Refill     ergocalciferol (VITAMIN D2) 400 units (10 mcg) TABS tablet Take 25 mcg by mouth daily       sertraline (ZOLOFT) 50 MG tablet Take 150 mg by mouth daily       traZODone (DESYREL) 50 MG tablet Take 50 mg by mouth At Bedtime         No past surgical history on file.       Allergies   Allergen Reactions     Penicillins          Mental Health History     Prior Mental Health Diagnosis: depression and anxiety    Current Mental Health Treatment: None reported    Prior Chemical Dependency Treatment:  None reported      Family History     Family History   Problem Relation Age of Onset     Migraines Mother      Irritable Bowel Syndrome Mother      GERD Mother      Asthma Father      Allergies Father      Sleep Apnea Father        Family History of Sleep Disorders: None reported  Social History     Social History     Socioeconomic History     Marital status: Single     Spouse name: None     Number of children: None     Years of education: None     Highest education level: None   Occupational History     None   Social Needs     Financial resource strain: None     Food insecurity     Worry: None     Inability: None     Transportation needs     Medical: None     Non-medical: None   Tobacco Use     Smoking status: Never Smoker     Smokeless tobacco: Never Used   Substance and Sexual Activity     Alcohol use: Yes     Frequency: Monthly or less     Drinks per session: 1 or 2     Drug use: Not Currently     Sexual activity: None   Lifestyle     Physical activity     Days per week: None     Minutes per session: None     Stress: None   Relationships     Social connections     Talks on phone: None     Gets together: None     Attends Buddhist service: None     Active member of club or organization: None     Attends meetings of clubs or organizations: None     Relationship status: None     Intimate partner violence     Fear of current or ex partner: None      Emotionally abused: None     Physically abused: None     Forced sexual activity: None   Other Topics Concern     None   Social History Narrative     None       Single, engaged, lives in apartment with fiance and shares apartment with others     Mental Status Examination     William presented as appropriately dressed and groomed and was oriented X3 with speech language intact.  The patient was cooperative throughout the evaluation with no signs of hallucinations, delusions, loosening of associations or other thought disturbance.  Mood was normal Affect was congruent with mood. Insight and judgement were intact.  Memory appeared intact for recent and remote elements.  There was no report of suicidal ideation, intention or plan. Attention and concentration were within normal.      Copy:   No Ref-Primary, Physician               Melissa Del Valle MD  [address]  [place] [address]    Jese Bloom PsyD, [place], [facility]  Diplomate, Behavioral Sleep Medicine  [facility]    Note: This dictation was created using voice recognition software. This document may contain an error not identified before finalizing the document. If the error changes the accuracy of the document, I would appreciate it being brought to my attention.

## 2020-12-04 NOTE — PATIENT INSTRUCTIONS
"  William, it was a pleasure to have the opportunity to discuss your sleep concerns and explore strategies to help improve sleep quality and reduce nightmares.  Below you will find basic instructions for improving sleep consolidation and managing your sleep schedule.  Additionally I recommend:  -Reduce caffeine use to 3 beverages and after 3 PM  -Stop using electronic devices by about 10:30 PM and as much as possible avoid using your bedroom for activities other than sleep.  I do appreciate that your privacy space is limited in your shared apartment so do the best you can to make going to bed and getting up routines consistent.  -Make sure you get adequate exposure to ambient light in the morning when you get up  -Avoid taking naps after 2 PM.  Download the application called [employer] .  In the morning record your recollection of sleep by going to the \"my sleep\" section and add an entry.  There is a comment section in which she can indicate the number of nightmares you had, the intensity rating them 0-10 and a brief note about the content.      Cognitive Behavioral Therapy for Insomnia (CBT-I)    Sleep Strengthening    Now that you understand a bit more about how sleep works and what causes insomnia, you are ready to move on to the core of [facility]:  Sleep Strengthening.  This process involves five key strategies that will:    ? Strengthen your sleep drive and biological sleep clock  ? Strengthen the link between your bed and sleep    Core Sleep Strengthening Strategies     You are now ready to start the process of strengthening your sleep. Much like physical therapy strengthens muscles, studies show these five sleep strategies are the key to achieving stronger, healthier sleep.     1. Reduce your  Time In Bed    Spending extra time in bed trying to get more sleep actually can cause more sleep disruption and weaken sleep efficiency. Sleep efficiency is simply the percentage of time a person spends asleep while in bed. " Normal sleep efficiency is thought to be 85% or greater.  By reducing your time in bed, you will be awake longer leading to quicker and deeper sleep. This strategy does not reduce the amount of sleep you get, just the time you are awake in bed:                                       Your Sleep Schedule Prescription    During the first step of sleep strengthening, you will reduce your time in bed following a Sleep Schedule Prescription. Your prescription is determined by the average nightly amount of sleep you got over the past two weeks plus 30 minutes. It also takes into account the best time for you to sleep. The least amount of time prescribed is 6 hours in bed unless a sleep specialist recommends otherwise.     Total Time in Bed:  9 hours  Bedtime:  No earlier than  Midnight  Wake-up Time:  Out of bed every day by  9 AM with alarm and make sure you get bright light exposure in the AM when you get up.     2. Don't go to bed until you feel sleepy (not tired or fatigued)     This helps increase your sleep drive by keeping you awake longer.  If you go to bed when you're not sleepy, it gives your brain the wrong message and can lead to frustration.     If you feel sleepy before your prescribed bedtime, find activities that can help you stay awake until it is time for you to go to bed. Even brief naps in the evening can be very disruptive of sleep at night.     3. Don't stay in bed unless you are sleeping      If you are unable to fall asleep or return to sleep after about 30 minutes, get out of bed. This helps train your brain that the bed is for sleep. It is harmful to your sleep when you are worried or frustrated in bed. Do not read, eat, worry, think about sleep, use mobile phones or tablets, or watch TV in bed. Do not watch the clock during the night.     Go to another room and do something relaxing. Plan things you can do when you get out of bed. Avoid use of mobile devices or computers when out of bed.    Return  to bed only when you feel sleepy again.  Repeat as often as needed throughout the night.     4. Get out of bed at the same time every day    Getting up at the same time helps set your biological clock. It is important to stick to your wake time no matter how much sleep you got the night before or how you feel in the morning.    Varying your wake can have the same effect as jet lag.  It disrupts your biological clock and makes you feel more tired and exhausted.  Trying to  catch up  on sleep on the weekends only makes things worse and sets you up for a cycle of poor sleep the following weekdays.      Make sure you set an alarm and keep it away from the bed to prevent looking at the clock during the night.      5. Avoid daytime napping    Avoid daytime napping if possible. Napping partially fulfills your need for sleep and weakens your sleep drive at night.    However, if you find yourself sleepy (not just tired) you can take a brief 15-30 minute nap during the day if needed.  Naps within 7-8 hours of your prescribed wake time are less likely to affect your sleep the coming night.  Sleepy people make more mistakes and may hurt themselves or others. Therefore, safety trumps all other sleep prescriptions.  Never drive or operate equipment if drowsy or sleepy.

## 2021-01-03 ENCOUNTER — HEALTH MAINTENANCE LETTER (OUTPATIENT)
Age: 21
End: 2021-01-03

## 2021-10-10 ENCOUNTER — HEALTH MAINTENANCE LETTER (OUTPATIENT)
Age: 21
End: 2021-10-10

## 2022-01-29 ENCOUNTER — HEALTH MAINTENANCE LETTER (OUTPATIENT)
Age: 22
End: 2022-01-29

## 2022-09-18 ENCOUNTER — HEALTH MAINTENANCE LETTER (OUTPATIENT)
Age: 22
End: 2022-09-18

## 2023-05-07 ENCOUNTER — HEALTH MAINTENANCE LETTER (OUTPATIENT)
Age: 23
End: 2023-05-07

## 2025-08-30 ENCOUNTER — HEALTH MAINTENANCE LETTER (OUTPATIENT)
Age: 25
End: 2025-08-30